# Patient Record
Sex: MALE | Race: WHITE
[De-identification: names, ages, dates, MRNs, and addresses within clinical notes are randomized per-mention and may not be internally consistent; named-entity substitution may affect disease eponyms.]

---

## 2021-03-19 ENCOUNTER — HOSPITAL ENCOUNTER (OUTPATIENT)
Dept: HOSPITAL 35 - LAB | Age: 71
End: 2021-03-19
Attending: ANESTHESIOLOGY
Payer: COMMERCIAL

## 2021-03-19 DIAGNOSIS — Z01.812: Primary | ICD-10-CM

## 2021-03-19 DIAGNOSIS — Z20.822: ICD-10-CM

## 2021-03-22 ENCOUNTER — HOSPITAL ENCOUNTER (EMERGENCY)
Dept: HOSPITAL 61 - ER | Age: 71
LOS: 1 days | Discharge: HOME | End: 2021-03-23
Payer: MEDICARE

## 2021-03-22 VITALS — BODY MASS INDEX: 27.33 KG/M2 | WEIGHT: 180.34 LBS | HEIGHT: 68 IN

## 2021-03-22 DIAGNOSIS — I10: ICD-10-CM

## 2021-03-22 DIAGNOSIS — M54.5: ICD-10-CM

## 2021-03-22 DIAGNOSIS — Z98.890: ICD-10-CM

## 2021-03-22 DIAGNOSIS — R04.0: Primary | ICD-10-CM

## 2021-03-22 DIAGNOSIS — Z90.89: ICD-10-CM

## 2021-03-22 LAB
BASOPHILS # BLD AUTO: 0 X10^3/UL (ref 0–0.2)
BASOPHILS NFR BLD: 0 % (ref 0–3)
EOSINOPHIL NFR BLD: 0 % (ref 0–3)
EOSINOPHIL NFR BLD: 0 X10^3/UL (ref 0–0.7)
ERYTHROCYTE [DISTWIDTH] IN BLOOD BY AUTOMATED COUNT: 13.1 % (ref 11.5–14.5)
HCT VFR BLD CALC: 36 % (ref 39–53)
HGB BLD-MCNC: 12.4 G/DL (ref 13–17.5)
LYMPHOCYTES # BLD: 1.4 X10^3/UL (ref 1–4.8)
LYMPHOCYTES NFR BLD AUTO: 11 % (ref 24–48)
MCH RBC QN AUTO: 31 PG (ref 25–35)
MCHC RBC AUTO-ENTMCNC: 34 G/DL (ref 31–37)
MCV RBC AUTO: 89 FL (ref 79–100)
MONO #: 1.1 X10^3/UL (ref 0–1.1)
MONOCYTES NFR BLD: 9 % (ref 0–9)
NEUT #: 10 X10^3/UL (ref 1.8–7.7)
NEUTROPHILS NFR BLD AUTO: 80 % (ref 31–73)
PLATELET # BLD AUTO: 204 X10^3/UL (ref 140–400)
RBC # BLD AUTO: 4.06 X10^6/UL (ref 4.3–5.7)
WBC # BLD AUTO: 12.5 X10^3/UL (ref 4–11)

## 2021-03-22 PROCEDURE — 99285 EMERGENCY DEPT VISIT HI MDM: CPT

## 2021-03-22 PROCEDURE — 96360 HYDRATION IV INFUSION INIT: CPT

## 2021-03-22 PROCEDURE — 85025 COMPLETE CBC W/AUTO DIFF WBC: CPT

## 2021-03-22 PROCEDURE — 36415 COLL VENOUS BLD VENIPUNCTURE: CPT

## 2021-03-22 NOTE — PHYS DOC
Past Medical History


Past Medical History:  Hypertension, Unknown


Past Surgical History:  Tonsillectomy, Other


Additional Past Surgical Histo:  prostate, Deviated septum repair with turbinate

resection march 2021


Smoking Status:  Never Smoker


Alcohol Use:  Occasionally


Drug Use:  None





General Adult


EDM:


Chief Complaint:  NOSEBLEED





HPI:


HPI:





Patient is a 70 year old male who presents to the ED via passenger vehicle with 

Nosebleed of 1 hour duration. Patient underwent outpatient surgery, deviated 

septum repair with turbinate removal, at 0845 this morning by Dr. Randall Stanley (ENT). Patient reports nose was bleeding in PACU but was controlled 

with pressure. Patient was discharged from [facility] with

instruction to return or go to ED if bleeding resumed. Patient reports that 

bleeding started again suddenly and aggressively around 1700 at which point he 

decided to present to ED. Patient is not on blood thinners. Patient pain is well

controlled at this time.





Review of Systems:


Review of Systems:


Constitutional: Denies fever or chills 


Eyes: Denies redness or eye pain 


HENT: Patient has obvious bright red blood draining from both nares (R>L). 

Patient has nasal congestion s/p recent septum surgery. 


Respiratory: Denies cough or shortness of breath 


Cardiovascular: Denies chest pain or palpitations


GI: Denies abdominal pain, nausea, or vomiting


: Denies dysuria or hematuria


Musculoskeletal: Patient has low back and hamstring pain. Denies joint pain. 


Integument: Denies rash or skin lesions 


Neurologic: Denies headache, focal weakness or sensory changes





Complete systems were reviewed and found to be within normal limits, except as 

documented in this note.





Heart Score:


C/O Chest Pain:  N/A





Family History:


Family History:


Patient has no pertinent family history.





Current Medications:





Current Medications








 Medications


  (Trade)  Dose


 Ordered  Sig/Kilo  Start Time


 Stop Time Status Last Admin


Dose Admin


 


 Oxymetazoline HCl


  (Afrin)  2 spray  1X  ONCE  3/22/21 19:00


 3/22/21 19:01 DC 3/22/21 20:52


2 SPRAY


 


 Tranexamic Acid


  (Cyklokapron)  1,000 mg  1X  ONCE  3/22/21 21:15


 3/22/21 21:16 DC  














Allergies:


Allergies:





Allergies








Coded Allergies Type Severity Reaction Last Updated Verified


 


  No Known Drug Allergies    5/24/18 No











Physical Exam:


PE:


Constitutional: Well developed, well nourished, no acute distress, non-toxic 

appearance


HENT: Bright red bleeding from nares (R>L). Patient presents with packing soaked

in coagulated blood. Blood was suctioned and nose was inspected. No arterial 

bleed was directly visualized. Patient reports surgeon placed stents bilaterally

in nose during surgery. 


Eyes: PERRL, EOMI, conjunctiva normal, bloody discharge noted bilaterally. 


Neck: Normal range of motion, no tenderness, supple


Lungs & Thorax:  No respiratory distress, equal chest rise and fall


Abdomen: Soft, no tenderness


Skin: Warm, dry, no erythema, no rash


Back: No tenderness, no CVA tenderness


Extremities: No tenderness, ROM intact, no edema


Neurologic: Alert and oriented X 3, normal motor function, normal sensory 

function, no focal deficits noted


Psychologic: Affect normal, judgment normal





Current Patient Data:


Vital Signs:





                                   Vital Signs








  Date Time  Temp Pulse Resp B/P (MAP) Pulse Ox O2 Delivery O2 Flow Rate FiO2


 


3/22/21 19:35 98.3 78  122/81 (95) 95 Room Air  





 98.3       











EKG:


EKG:


[]





Radiology/Procedures:


Radiology/Procedures:


[]





Course & Med Decision Making:


Course & Med Decision Making


Patient is a 70 year old male who presents to the ED via passenger vehicle with 

Nosebleed of 1 hour duration. Patient underwent outpatient surgery, deviated 

septum repair with turbinate removal, at 0845 this morning by Dr. Randall Stanley (ENT). Patient reports nose was bleeding in PACU but was controlled 

with pressure. Patient was discharged from [facility] with

 instruction to return or to seek care in ED if bleeding resumed. Patient 

reports that bleeding started again suddenly and aggressively around 1700 at 

which point he decided to present to ED. Patient is not on blood thinners. 

Patient pain is well controlled at this time. Blood soaked gauze was removed and

 clots in nares bilaterally were suctioned. Nares were inspected and bleed was 

identified in Right nare. Nasal epistaxis was achieved after failed trial of 

Afrin followed by success with administration of TXA (see procedure note). 

Estimated blood loss during ED visit 400 mL. Labs ordered to rule out need to 

administer blood products at this time. Patient had vasovagal episode of syncope

 in the period following successful epistaxis. Patient was observed and 

monitored to confirm hemodynamic stability. Patient stable for discharge with 

outpatient follow-up with Dr. Randall Stanley (ENT surgeon). Discussed findings 

and plan with patient and partner, who acknowledged understanding and agreement.





Dragon Disclaimer:


Dragon Disclaimer:


This electronic medical record was generated, in whole or in part, using a voice

 recognition dictation system.





Additional Procedures


Progress


Epistaxis control





Verbal consent obtained. Time out performed. Hand hygiene utilized.  Blood clots

 from bilateral nares (right greater than left) suctioned with Yankauer suction.

  Afrin sprays x2 followed by direct pressure for 15 minutes performed x3 

rounds.  Patient with continued active bleeding. 





Blood clots again suctioned with secondary treatment of TXA with atomizer to 

bilateral nares with total of 5 mL to each nare which was followed by direct 

pressure.  Bleeding with interval improvement/resolution.  Patient tolerated 

procedure well and without difficulty. Estimated blood loss during entire ED 

visit 400 mL.





Departure


Departure


Impression:  


   Primary Impression:  


   Postsurgical epistaxis


Disposition:  01 [place] HOME SELF CARE/HOMELESS


Condition:  STABLE


Referrals:  


CAMRYN ALONZO MD (PCP)


Patient Instructions:  Nosebleed, Easy-to-Read





Additional Instructions:  


Call your ENT in AM for further evaluation.





If bleeding returns, remove blood clot as much as possible, 2 sprays of Afrin to

 each nostril, and then hold pressure for 15 mins.  If bleeding continues, 

repeat step I x 2 rounds as needed.  If bleeding continues after 3 rounds of 

Afrin and holding nose for total of 45mins, then return to ED.





May benefit by returning to [place] to be further evaluated by your ENT.











MIHAI HATHAWAY DO             Mar 22, 2021 23:30

## 2021-03-23 VITALS — DIASTOLIC BLOOD PRESSURE: 77 MMHG | SYSTOLIC BLOOD PRESSURE: 131 MMHG

## 2021-06-24 ENCOUNTER — HOSPITAL ENCOUNTER (EMERGENCY)
Dept: HOSPITAL 61 - ER | Age: 71
Discharge: HOME | End: 2021-06-24
Payer: COMMERCIAL

## 2021-06-24 VITALS — SYSTOLIC BLOOD PRESSURE: 164 MMHG | DIASTOLIC BLOOD PRESSURE: 82 MMHG

## 2021-06-24 VITALS — WEIGHT: 180.34 LBS | BODY MASS INDEX: 27.33 KG/M2 | HEIGHT: 68 IN

## 2021-06-24 DIAGNOSIS — K82.1: Primary | ICD-10-CM

## 2021-06-24 DIAGNOSIS — K21.9: ICD-10-CM

## 2021-06-24 DIAGNOSIS — I10: ICD-10-CM

## 2021-06-24 DIAGNOSIS — K80.50: ICD-10-CM

## 2021-06-24 LAB
ALBUMIN SERPL-MCNC: 4.3 G/DL (ref 3.4–5)
ALBUMIN/GLOB SERPL: 1.4 {RATIO} (ref 1–1.7)
ALP SERPL-CCNC: 60 U/L (ref 46–116)
ALT SERPL-CCNC: 20 U/L (ref 16–63)
ANION GAP SERPL CALC-SCNC: 11 MMOL/L (ref 6–14)
APTT PPP: YELLOW S
AST SERPL-CCNC: 18 U/L (ref 15–37)
BACTERIA #/AREA URNS HPF: 0 /HPF
BASOPHILS # BLD AUTO: 0.1 X10^3/UL (ref 0–0.2)
BASOPHILS NFR BLD: 1 % (ref 0–3)
BILIRUB SERPL-MCNC: 0.6 MG/DL (ref 0.2–1)
BILIRUB UR QL STRIP: NEGATIVE
BUN SERPL-MCNC: 22 MG/DL (ref 8–26)
BUN/CREAT SERPL: 16 (ref 6–20)
CALCIUM SERPL-MCNC: 9.3 MG/DL (ref 8.5–10.1)
CHLORIDE SERPL-SCNC: 103 MMOL/L (ref 98–107)
CK SERPL-CCNC: 94 U/L (ref 39–308)
CO2 SERPL-SCNC: 29 MMOL/L (ref 21–32)
CREAT SERPL-MCNC: 1.4 MG/DL (ref 0.7–1.3)
EOSINOPHIL NFR BLD: 0.1 X10^3/UL (ref 0–0.7)
EOSINOPHIL NFR BLD: 1 % (ref 0–3)
ERYTHROCYTE [DISTWIDTH] IN BLOOD BY AUTOMATED COUNT: 13.6 % (ref 11.5–14.5)
FIBRINOGEN PPP-MCNC: CLEAR MG/DL
GFR SERPLBLD BASED ON 1.73 SQ M-ARVRAT: 50.1 ML/MIN
GLUCOSE SERPL-MCNC: 101 MG/DL (ref 70–99)
HCT VFR BLD CALC: 39.8 % (ref 39–53)
HGB BLD-MCNC: 13.4 G/DL (ref 13–17.5)
LIPASE: 54 U/L (ref 73–393)
LYMPHOCYTES # BLD: 1 X10^3/UL (ref 1–4.8)
LYMPHOCYTES NFR BLD AUTO: 11 % (ref 24–48)
MCH RBC QN AUTO: 28 PG (ref 25–35)
MCHC RBC AUTO-ENTMCNC: 34 G/DL (ref 31–37)
MCV RBC AUTO: 84 FL (ref 79–100)
MONO #: 0.5 X10^3/UL (ref 0–1.1)
MONOCYTES NFR BLD: 6 % (ref 0–9)
NEUT #: 7 X10^3/UL (ref 1.8–7.7)
NEUTROPHILS NFR BLD AUTO: 81 % (ref 31–73)
NITRITE UR QL STRIP: NEGATIVE
PH UR STRIP: 5.5 [PH]
PLATELET # BLD AUTO: 215 X10^3/UL (ref 140–400)
POTASSIUM SERPL-SCNC: 3.4 MMOL/L (ref 3.5–5.1)
PROT SERPL-MCNC: 7.4 G/DL (ref 6.4–8.2)
PROT UR STRIP-MCNC: NEGATIVE MG/DL
RBC # BLD AUTO: 4.75 X10^6/UL (ref 4.3–5.7)
RBC #/AREA URNS HPF: 0 /HPF (ref 0–2)
SODIUM SERPL-SCNC: 143 MMOL/L (ref 136–145)
UROBILINOGEN UR-MCNC: 0.2 MG/DL
WBC # BLD AUTO: 8.7 X10^3/UL (ref 4–11)
WBC #/AREA URNS HPF: 0 /HPF (ref 0–4)

## 2021-06-24 PROCEDURE — 76705 ECHO EXAM OF ABDOMEN: CPT

## 2021-06-24 PROCEDURE — 84484 ASSAY OF TROPONIN QUANT: CPT

## 2021-06-24 PROCEDURE — 81001 URINALYSIS AUTO W/SCOPE: CPT

## 2021-06-24 PROCEDURE — 93005 ELECTROCARDIOGRAM TRACING: CPT

## 2021-06-24 PROCEDURE — 96361 HYDRATE IV INFUSION ADD-ON: CPT

## 2021-06-24 PROCEDURE — 36415 COLL VENOUS BLD VENIPUNCTURE: CPT

## 2021-06-24 PROCEDURE — 83690 ASSAY OF LIPASE: CPT

## 2021-06-24 PROCEDURE — 85025 COMPLETE CBC W/AUTO DIFF WBC: CPT

## 2021-06-24 PROCEDURE — 99285 EMERGENCY DEPT VISIT HI MDM: CPT

## 2021-06-24 PROCEDURE — 96374 THER/PROPH/DIAG INJ IV PUSH: CPT

## 2021-06-24 PROCEDURE — 82553 CREATINE MB FRACTION: CPT

## 2021-06-24 PROCEDURE — 83735 ASSAY OF MAGNESIUM: CPT

## 2021-06-24 PROCEDURE — 80053 COMPREHEN METABOLIC PANEL: CPT

## 2021-06-24 PROCEDURE — 74177 CT ABD & PELVIS W/CONTRAST: CPT

## 2021-06-24 NOTE — RAD
EXAMINATION: CT abdomen and pelvis with IV contrast.



INDICATION:70 years, Male, abdominal pain.



TECHNIQUE: Axial CT images of the abdomen and pelvis were obtained. Coronal and sagittal reformatted 
performed.



COMPARISON:  None.



Exposure: One or more of the following individualized dose reduction techniques were utilized for thi
s examination:  

1. Automated exposure control  

2. Adjustment of the mA and/or kV according to patient size  

3. Use of iterative reconstruction technique.



FINDINGS:



LOWER CHEST:

2 mm fissure based nodule in the right middle lobe. Dependent subsegmental atelectasis in bibasilar l
ungs.   



ABDOMEN/PELVIS:

Normal morphology and size of the liver with homogeneous enhancement. Subcentimeter hypodensities in 
both hepatic lobes, too small to characterize. Gallbladder hydrops without wall thickening or pericho
lecystic fat stranding. Tiny calcified cholelithiasis. No biliary ductal dilation. Normal spleen. Mil
dly atrophic pancreatic parenchyma without ductal dilation. No adrenal nodule. No hydronephrosis or n
ephrolithiasis in either kidney. Nonspecific bilateral perinephric fat stranding.



Distal esophageal wall thickening, may reflect reflux esophagitis. Small hiatal hernia. Stomach is de
compressed which limits evaluation. Uncomplicated D2 duodenal diverticulum. No bowel obstruction. Col
onic diverticulosis without diverticulitis. Appendix is not seen with certainty. Focal narrowing at t
he celiac trunk origin, findings can be seen in medial arcuate ligament syndrome. Normal caliber abdo
emily aorta, demonstrates mild atherosclerotic calcifications. Mesenteric arteries and portal vein ar
e patent. No lymphadenopathy in the abdomen or pelvis by size criteria. No ascites or pneumoperitoneu
m. Unremarkable urinary bladder and prostate. No suspicious pelvic masses.



MUSCULOSKELETAL: 

Bilateral L5 pars defect with grade 1 anterolisthesis of L5 over S1 and severe degenerative changes. 
No acute osseous process or suspicious lesion. Small fat-containing umbilical and right inguinal willian
ias.   



IMPRESSION:

1. No acute abnormality in the abdomen or pelvis.

2. Gallbladder hydrops with tiny cholelithiasis. No CT evidence of acute cholecystitis.

3. Colonic diverticulosis without diverticulitis.

4. Other chronic/incidental findings, as described above.



Electronically signed by: Paresh Goodwin MD (6/24/2021 7:37 AM) [facility]

## 2021-06-24 NOTE — EKG
[facility]

              [address] [place] [address]

Test Date:    2021               Test Time:    05:57:52

Pat Name:     SAROJ HOFF               Department:   

Patient ID:   [id number]           Room:          

Gender:       M                        Technician:   

:          1950               Requested By: ELLIS MCKEE

Order Number: [id number]           Reading MD:     

                                 Measurements

Intervals                              Axis          

Rate:         66                       P:            31

ND:           198                      QRS:          -14

QRSD:         86                       T:            58

QT:           420                                    

QTc:          442                                    

                           Interpretive Statements

SINUS RHYTHM

LEFTWARD AXIS

OTHERWISE NORMAL ECG

RI6.02

No previous ECG available for comparison

## 2021-06-24 NOTE — PHYS DOC
Past Medical History


Past Medical History:  GERD, Hypertension, Unknown


Past Surgical History:  Tonsillectomy, Other


Additional Past Surgical Histo:  prostate, Deviated septum repair with turbinate

resection march 2021


Smoking Status:  Never Smoker


Alcohol Use:  Occasionally


Drug Use:  None





General Adult


EDM:


Chief Complaint:  ABDOMINAL PAIN





HPI:


HPI:





70-year-old male presents with report of epigastric abdominal pain which she 

reports is dull in nature that started suddenly at approximately 0230 this 

morning.  Patient denies radiation of pain.  Denies chest pain.  Denies nausea 

or vomiting.  Denies trauma.  Denies dysuria or hematuria.  Patient reports 

history of gastritis for which he is on medication.  Patient reports taking an 

additional antiacid without significant improvement of his symptoms.  Denies 

shortness of breath or cough.  Denies known sick contacts.





Review of Systems:


Review of Systems:





Constitutional: Denies fever or chills 


Eyes: Denies redness or eye pain 


HENT: Denies nasal congestion or sore throat


Respiratory: Denies cough or shortness of breath 


Cardiovascular: Denies chest pain or palpitations


GI: Reports epigastric abdominal pain; denies nausea or vomiting


: Denies dysuria or hematuria


Musculoskeletal: Denies back pain or joint pain


Integument: Denies rash or skin lesions 


Neurologic: Denies headache, focal weakness or sensory changes





Complete systems were reviewed and found to be within normal limits, except as 

documented in this note.





Heart Score:


C/O Chest Pain:  No





Allergies:


Allergies:





Allergies








Coded Allergies Type Severity Reaction Last Updated Verified


 


  No Known Drug Allergies    5/24/18 No











Physical Exam:


PE:





Constitutional: Well developed, well nourished, no acute distress, non-toxic 

appearance


HENT: Normocephalic, atraumatic


Eyes: Conjunctiva normal, no discharge


Neck: Normal range of motion, supple


Lungs & Thorax:  No respiratory distress, equal chest rise and fall


Abdomen: Soft, epigastric tenderness, no guarding/rebound tenderness/distention


Skin: Warm, dry, no erythema, no rash


Back: No tenderness, no CVA tenderness


Extremities: No tenderness, ROM intact, no edema


Neurologic: Alert and oriented X 3, no focal deficits noted


Psychologic: Affect normal, judgment normal





Current Patient Data:


Labs:





                                Laboratory Tests








Test


 6/24/21


05:35


 


White Blood Count


 8.7 x10^3/uL


(4.0-11.0)


 


Red Blood Count


 4.75 x10^6/uL


(4.30-5.70)


 


Hemoglobin


 13.4 g/dL


(13.0-17.5)


 


Hematocrit


 39.8 %


(39.0-53.0)


 


Mean Corpuscular Volume


 84 fL ()





 


Mean Corpuscular Hemoglobin 28 pg (25-35)  


 


Mean Corpuscular Hemoglobin


Concent 34 g/dL


(31-37)


 


Red Cell Distribution Width


 13.6 %


(11.5-14.5)


 


Platelet Count


 215 x10^3/uL


(140-400)


 


Neutrophils (%) (Auto) 81 % (31-73)  H


 


Lymphocytes (%) (Auto) 11 % (24-48)  L


 


Monocytes (%) (Auto) 6 % (0-9)  


 


Eosinophils (%) (Auto) 1 % (0-3)  


 


Basophils (%) (Auto) 1 % (0-3)  


 


Neutrophils # (Auto)


 7.0 x10^3/uL


(1.8-7.7)


 


Lymphocytes # (Auto)


 1.0 x10^3/uL


(1.0-4.8)


 


Monocytes # (Auto)


 0.5 x10^3/uL


(0.0-1.1)


 


Eosinophils # (Auto)


 0.1 x10^3/uL


(0.0-0.7)


 


Basophils # (Auto)


 0.1 x10^3/uL


(0.0-0.2)


 


Sodium Level


 143 mmol/L


(136-145)


 


Potassium Level


 3.4 mmol/L


(3.5-5.1)  L


 


Chloride Level


 103 mmol/L


()


 


Carbon Dioxide Level


 29 mmol/L


(21-32)


 


Anion Gap 11 (6-14)  


 


Blood Urea Nitrogen


 22 mg/dL


(8-26)


 


Creatinine


 1.4 mg/dL


(0.7-1.3)  H


 


Estimated GFR


(Cockcroft-Gault) 50.1  





 


BUN/Creatinine Ratio 16 (6-20)  


 


Glucose Level


 101 mg/dL


(70-99)  H


 


Calcium Level


 9.3 mg/dL


(8.5-10.1)


 


Total Bilirubin


 0.6 mg/dL


(0.2-1.0)


 


Aspartate Amino Transferase


(AST) 18 U/L (15-37)





 


Alanine Aminotransferase (ALT)


 20 U/L (16-63)





 


Alkaline Phosphatase


 60 U/L


()


 


Total Protein


 7.4 g/dL


(6.4-8.2)


 


Albumin


 4.3 g/dL


(3.4-5.0)


 


Albumin/Globulin Ratio 1.4 (1.0-1.7)  


 


Lipase


 54 U/L


()  L





                                Laboratory Tests


6/24/21 05:35








                                Laboratory Tests


6/24/21 05:35








Vital Signs:





                                   Vital Signs








  Date Time  Temp Pulse Resp B/P (MAP) Pulse Ox O2 Delivery O2 Flow Rate FiO2


 


6/24/21 04:04 97.6 57 20 192/93 (126) 96 Room Air  





 97.6       











EKG:


EKG:


@0557 NSR at 66bpm, NO ST elevation, QRS 86ms, QT/QTc 420/442ms





Radiology/Procedures:


Radiology/Procedures:


PROCEDURE: CT ABD PELV W/ IV CONTRST ONLY





EXAMINATION: CT abdomen and pelvis with IV contrast.





INDICATION:70 years, Male, abdominal pain.





TECHNIQUE: Axial CT images of the abdomen and pelvis were obtained. Coronal and 

sagittal reformatted performed.





COMPARISON:  None.





Exposure: One or more of the following individualized dose reduction techniques 

were utilized for this examination:  


1. Automated exposure control  


2. Adjustment of the mA and/or kV according to patient size  


3. Use of iterative reconstruction technique.





FINDINGS:





LOWER CHEST:


2 mm fissure based nodule in the right middle lobe. Dependent subsegmental 

atelectasis in bibasilar lungs.   





ABDOMEN/PELVIS:


Normal morphology and size of the liver with homogeneous enhancement. Subc

entimeter hypodensities in both hepatic lobes, too small to characterize. 

Gallbladder hydrops without wall thickening or pericholecystic fat stranding. 

Tiny calcified cholelithiasis. No biliary ductal dilation. Normal spleen. Mildly

atrophic pancreatic parenchyma without ductal dilation. No adrenal nodule. No 

hydronephrosis or nephrolithiasis in either kidney. Nonspecific bilateral 

perinephric fat stranding.





Distal esophageal wall thickening, may reflect reflux esophagitis. Small hiatal 

hernia. Stomach is decompressed which limits evaluation. Uncomplicated D2 

duodenal diverticulum. No bowel obstruction. Colonic diverticulosis without 

diverticulitis. Appendix is not seen with certainty. Focal narrowing at the 

celiac trunk origin, findings can be seen in medial arcuate ligament syndrome. 

Normal caliber abdominal aorta, demonstrates mild atherosclerotic 

calcifications. Mesenteric arteries and portal vein are patent. No 

lymphadenopathy in the abdomen or pelvis by size criteria. No ascites or 

pneumoperitoneum. Unremarkable urinary bladder and prostate. No suspicious 

pelvic masses.





MUSCULOSKELETAL: 


Bilateral L5 pars defect with grade 1 anterolisthesis of L5 over S1 and severe 

degenerative changes. No acute osseous process or suspicious lesion. Small fat-

containing umbilical and right inguinal hernias.   





IMPRESSION:


1. No acute abnormality in the abdomen or pelvis.


2. Gallbladder hydrops with tiny cholelithiasis. No CT evidence of acute 

cholecystitis.


3. Colonic diverticulosis without diverticulitis.


4. Other chronic/incidental findings, as described above.





Electronically signed by: Paresh Goodwin MD (6/24/2021 7:37 AM) [facility]





Course & Med Decision Making:


Course & Med Decision Making


Pertinent Labs and Imaging studies reviewed. (See chart for details)





Patient presents with sudden epigastric abdominal pain upon waking this morning 

at 0230.  Denies chest pain.  Denies shortness of air or cough.  Patient reports

some improvement upon arrival to the ER.  Abdomen nonperitoneal.  Screening EKG 

negative.  Labs obtained and posted to chart.





CT abdomen/pelvis with signs of gallbladder hydrops with cholelithiasis.  No 

acute signs of cholecystitis.  Ultrasound also obtained with normal size of 

common bile duct again with gallbladder hydrops with cholelithiasis and no other

acute signs of cholecystitis.





Patient stable for discharge with outpatient follow-up with PCP/general surgeon.

 General surgery referral provided.  Discussed findings and plan with patient, 

who acknowledges understanding and agreement.





Dragon Disclaimer:


Dragon Disclaimer:


This electronic medical record was generated, in whole or in part, using a voice

recognition dictation system.





Departure


Departure


Impression:  


   Primary Impression:  


   Gallbladder hydrops


   Additional Impression:  


   Biliary colic


Disposition:  01 HOME / SELF CARE / HOMELESS


Condition:  STABLE


Referrals:  


CAMRYN ALONZO MD (PCP)








CARMELINA VELAZQUEZ MD


Patient Instructions:  Cholelithiasis, Easy-to-Read, Fat and Cholesterol Control

Diet, Easy-to-Read





Additional Instructions:  


Please call and make an appointment to follow with a general surgeon regarding 

your gallbladder.





Maintain a "low fat" diet.


Scripts


Hydrocodone Bit/Acetaminophen (HYDROCODONE-APAP 5-325  **) 1 Tab Tablet


0.5-1 TAB PO PRN Q6HRS PRN for PAIN, #10 TAB 0 Refills


   Prov: MIHAI HATHAWAY DO         6/24/21 


Ondansetron (ONDANSETRON ODT) 4 Mg Tab.rapdis


1 TAB PO PRN Q6-8HRS PRN for NAUSEA, #16 TAB


   Prov: MIHAI HATHAWAY DO         6/24/21











MIHAI HATHAWAY DO             Jun 24, 2021 06:33

## 2021-06-24 NOTE — RAD
EXAMINATION: US ABDOMEN LIMITED



INDICATION: 70 years, Male, upper abdominal pain.



COMPARISON: Same day CT abdomen and pelvis



TECHNIQUE: Grayscale, color Doppler and limited spectral Doppler images of the right upper quadrant w
ere obtained.



FINDINGS: 



LIVER:

SIZE (LENGTH): 3.9 cm.

ECHOGENICITY: Normal   

PARENCHYMA: Mild heterogeneous echotexture. No discrete focal lesion.

INTRAHEPATIC BILE DUCTS: Nondilated.     

PORTAL VEIN: Patent with normal hepatopedal flow.



GALLBLADDER:

GALLBLADDER WALL THICKNESS: 2 mm 

MORPHOLOGY: Gallbladder hydrops. No wall hyperemia or pericholecystic free fluid.   

LUMEN: Normal.   

Positive Goodman's sign



COMMON BILE DUCT DIAMETER: 4 mm   



RIGHT KIDNEY:

MEASURES: 9.1 cm in length

MORPHOLOGY/PARENCHYMA: Normal corticomedullary differentiation with no shadowing calculus or discrete
 masses.   

COLLECTING SYSTEM: No hydronephrosis.   



Pancreas, IVC and abdominal aorta are obscured by overlying bowel gas.



OTHER:

RETROPERITONEUM, INFERIOR VENA CAVA: Normal caliber.   

AORTA: Normal caliber measures up to Field 2 cm

FLUID:No free fluid.    



IMPRESSION:

Gallbladder hydrops without other sonographic evidence of acute cholecystitis. Previously seen tiny c
alcified cholelithiasis is appreciated on the current exam. Positive Goodman's sign is nonspecific. Co
nsider HIDA scan, as warranted.



Electronically signed by: Paresh Goodwin MD (6/24/2021 8:42 AM) [facility]